# Patient Record
(demographics unavailable — no encounter records)

---

## 2024-11-18 NOTE — REASON FOR VISIT
[Follow-Up Visit] : a follow-up visit for [Other:___] : [unfilled] [Spouse] : spouse [Other: _____] : [unfilled] [FreeTextEntry2] : Patient is here today for Nail Care